# Patient Record
Sex: FEMALE | Race: WHITE | NOT HISPANIC OR LATINO | URBAN - METROPOLITAN AREA
[De-identification: names, ages, dates, MRNs, and addresses within clinical notes are randomized per-mention and may not be internally consistent; named-entity substitution may affect disease eponyms.]

---

## 2024-12-17 ENCOUNTER — APPOINTMENT (OUTPATIENT)
Dept: PRIMARY CARE | Facility: CLINIC | Age: 54
End: 2024-12-17

## 2025-07-20 ENCOUNTER — OFFICE VISIT (OUTPATIENT)
Dept: URGENT CARE | Age: 55
End: 2025-07-20
Payer: COMMERCIAL

## 2025-07-20 VITALS
HEART RATE: 71 BPM | WEIGHT: 131 LBS | OXYGEN SATURATION: 99 % | DIASTOLIC BLOOD PRESSURE: 72 MMHG | RESPIRATION RATE: 20 BRPM | TEMPERATURE: 97.8 F | SYSTOLIC BLOOD PRESSURE: 146 MMHG | BODY MASS INDEX: 24.11 KG/M2 | HEIGHT: 62 IN

## 2025-07-20 DIAGNOSIS — S80.12XA HEMATOMA OF LEFT LOWER LEG: Primary | ICD-10-CM

## 2025-07-20 RX ORDER — FLECAINIDE ACETATE 100 MG/1
100 TABLET ORAL 2 TIMES DAILY
COMMUNITY

## 2025-07-20 RX ORDER — CARVEDILOL 6.25 MG/1
6.25 TABLET ORAL
COMMUNITY

## 2025-07-20 RX ORDER — APIXABAN 5 MG/1
5 TABLET, FILM COATED ORAL 2 TIMES DAILY
COMMUNITY

## 2025-07-20 RX ORDER — ESTRADIOL ACETATE 0.05 MG/D
1 RING VAGINAL
COMMUNITY

## 2025-07-20 RX ORDER — CHLORHEXIDINE GLUCONATE ORAL RINSE 1.2 MG/ML
15 SOLUTION DENTAL AS NEEDED
COMMUNITY
Start: 2025-06-08

## 2025-07-20 ASSESSMENT — ENCOUNTER SYMPTOMS
WOUND: 1
COUGH: 0

## 2025-07-20 ASSESSMENT — PAIN SCALES - GENERAL: PAINLEVEL_OUTOF10: 9

## 2025-07-20 NOTE — PROGRESS NOTES
"Subjective   Patient ID: Drea Nicole is a 55 y.o. female. They present today with a chief complaint of Leg Swelling.    History of Present Illness  Patient presents with concern for left leg swelling after hitting it on the  while getting ready for bed last night. States she usually wears a compression sock for lymphedema due to radiation therapy, was getting ready for bed and had taken it off. She states today the leg is too swollen for her compression sock, she usually takes eliquis, but didn't take her dose today. States she was concerned for a blood clot in the leg from the injury.           Past Medical History  Allergies as of 07/20/2025 - Reviewed 07/20/2025   Allergen Reaction Noted    Sulfa (sulfonamide antibiotics) Hives and Nausea Only 07/18/2018    Ciprofloxacin Hives, Nausea Only, and Unknown 12/15/2014       Prescriptions Prior to Admission[1]     Medical History[2]    Surgical History[3]     reports that she has never smoked. She has never used smokeless tobacco. She reports that she does not currently use alcohol.    Review of Systems  Review of Systems   Respiratory:  Negative for cough.    Cardiovascular:  Positive for leg swelling. Negative for chest pain.   Skin:  Positive for wound.                                  Objective    Vitals:    07/20/25 1859   BP: 146/72   Pulse: 71   Resp: 20   Temp: 36.6 °C (97.8 °F)   TempSrc: Oral   SpO2: 99%   Weight: 59.4 kg (131 lb)   Height: 1.575 m (5' 2\")     No LMP recorded.    Physical Exam  Vitals reviewed.   Constitutional:       Appearance: Normal appearance.     Cardiovascular:      Rate and Rhythm: Normal rate.   Pulmonary:      Effort: Pulmonary effort is normal.     Skin:     Findings: Erythema present.      Comments: Left leg with lymphedema along the entire leg. Blood blister formed along the site of the injury at the lateral left lower leg. Surrounding area with eccymosis and increased edema, no open wounds noted. No warmth, " no streaking.     Neurological:      Mental Status: She is alert.         Procedures    Point of Care Test & Imaging Results from this visit  No results found for this visit on 25.   Imaging  No results found.    Cardiology, Vascular, and Other Imaging  No other imaging results found for the past 2 days      Diagnostic study results (if any) were reviewed by TRACY Munoz.    Assessment/Plan   Allergies, medications, history, and pertinent labs/EKGs/Imaging reviewed by TRACY Munoz.     Medical Decision Making  Advised eliquis is a blood thinner used to prevent clots from forming, advised she continue taking it as scheduled. Advised the anterior lateral leg has less deep veins that could form a concerning blood clot. Recommend elevation, range of motion to keep blood moving out of the leg and ACE wrap to tolerance to put pressure on the leg and help reduce lymphedema. F/up with PCP or ER for severe symptoms.    At time of discharge patient was clinically well-appearing and HDS for outpatient management. The patient and/or family was educated regarding diagnosis, supportive care, OTC and Rx medications. The patient and/or family was given the opportunity to ask questions prior to discharge.  They verbalized understanding of my discussion of the plans for treatment, expected course, indications to return to  or seek further evaluation in ED, and the need for timely follow up as directed.   They were provided with a work/school excuse if requested.      Orders and Diagnoses  Diagnoses and all orders for this visit:  Hematoma of left lower leg      Medical Admin Record      Patient disposition: Home    Electronically signed by TRACY Munoz  7:23 PM           [1] (Not in a hospital admission)   [2] History reviewed. No pertinent past medical history.  [3]   Past Surgical History:  Procedure Laterality Date     SECTION, CLASSIC  2016     Section    GALLBLADDER  SURGERY  12/20/2016    Gallbladder Surgery    HYSTERECTOMY  12/20/2016    Hysterectomy    KIDNEY SURGERY  12/20/2016    Kidney Surgery